# Patient Record
(demographics unavailable — no encounter records)

---

## 2024-12-16 NOTE — PHYSICAL EXAM
[Fully active, able to carry on all pre-disease performance without restriction] : Status 0 - Fully active, able to carry on all pre-disease performance without restriction [Normal] : affect appropriate [de-identified] : Just the erythema on the face consistent with SLE.

## 2024-12-16 NOTE — ASSESSMENT
[FreeTextEntry1] : 1. SLE, coming to explore possibility of continuing her Actemra treatments here since her immunologist is retiring and she is looking for alternative avenues and physician in the meantime.2. 2. Antiphospholipid syndrome, presently on Eliquis. She was on Coumadin but INR has been difficult to control most likely secondary to incomplete warfarin resistance since INR was therapeutic with higher doses.  The situation was discussed with the patient. Will find out if we could help the patient for her treatment of rheumatologic condition.  From coagulation standpoint, she seems to be doing better with Eliquis than Coumadin with no untoward effects. At this time will reevaluate the situation also with obtaining some blood work including CBC, CMP, INR/PTT. Further recommendations, as needed, after the above completed. All questions answered.  F/U in 6 months or sooner if new problems arise.

## 2024-12-16 NOTE — REVIEW OF SYSTEMS
[Night Sweats] : night sweats [Fatigue] : fatigue [Palpitations] : palpitations [Diarrhea: Grade 1 - Increase of <4 stools per day over baseline; mild increase in ostomy output compared to baseline] : Diarrhea: Grade 1 - Increase of <4 stools per day over baseline; mild increase in ostomy output compared to baseline [Joint Pain] : joint pain [Skin Rash] : skin rash [Dizziness] : dizziness [Insomnia] : insomnia [Easy Bruising] : a tendency for easy bruising [Negative] : Genitourinary [FreeTextEntry5] : Transient A-Fib [de-identified] : Facial [de-identified] : Occasionally with the adrenal insufficiency [de-identified] : Every once in a while

## 2024-12-16 NOTE — HISTORY OF PRESENT ILLNESS
[Disease:__________________________] : Disease: [unfilled] [Date: ____________] : Patient's last distress assessment performed on [unfilled]. [0 - No Distress] : Distress Level: 0 [ECOG Performance Status: 0 - Fully active, able to carry on all pre-disease performance without restriction] : Performance Status: 0 - Fully active, able to carry on all pre-disease performance without restriction [de-identified] : The patient is coming for a follow up and continuation of her treatments. The patient is interested in continuing with Actemra here in [place] with us since her physician at [facility] (immunologist) who is retiring, and the patient is looking for alternative avenues. The patient is otherwise stable with no new particular complaints. The patient will continue with her rheumatologist at [facility] since her immunologist will be retiring soon. 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 At present, she feels "OK, some days better than others". She was also diagnosed with adrenal insufficiency, and she was placed on cortisol, midodrine and Fluorinef.  The patient was asking if she could continue to receive her infusion of Actemra with us. In addition, she will be put back on Crestor 40 mg.

## 2024-12-16 NOTE — REVIEW OF SYSTEMS
[Night Sweats] : night sweats [Fatigue] : fatigue [Palpitations] : palpitations [Diarrhea: Grade 1 - Increase of <4 stools per day over baseline; mild increase in ostomy output compared to baseline] : Diarrhea: Grade 1 - Increase of <4 stools per day over baseline; mild increase in ostomy output compared to baseline [Joint Pain] : joint pain [Skin Rash] : skin rash [Dizziness] : dizziness [Insomnia] : insomnia [Easy Bruising] : a tendency for easy bruising [Negative] : Genitourinary [FreeTextEntry5] : Transient A-Fib [de-identified] : Facial [de-identified] : Occasionally with the adrenal insufficiency [de-identified] : Every once in a while

## 2024-12-16 NOTE — PHYSICAL EXAM
[Fully active, able to carry on all pre-disease performance without restriction] : Status 0 - Fully active, able to carry on all pre-disease performance without restriction [Normal] : affect appropriate [de-identified] : Just the erythema on the face consistent with SLE.

## 2024-12-16 NOTE — HISTORY OF PRESENT ILLNESS
[Disease:__________________________] : Disease: [unfilled] [Date: ____________] : Patient's last distress assessment performed on [unfilled]. [0 - No Distress] : Distress Level: 0 [ECOG Performance Status: 0 - Fully active, able to carry on all pre-disease performance without restriction] : Performance Status: 0 - Fully active, able to carry on all pre-disease performance without restriction [de-identified] : The patient is coming for a follow up and continuation of her treatments. The patient is interested in continuing with Actemra here in [place] with us since her physician at [facility] (immunologist) who is retiring, and the patient is looking for alternative avenues. The patient is otherwise stable with no new particular complaints. The patient will continue with her rheumatologist at [facility] since her immunologist will be retiring soon. 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 At present, she feels "OK, some days better than others". She was also diagnosed with adrenal insufficiency, and she was placed on cortisol, midodrine and Fluorinef.  The patient was asking if she could continue to receive her infusion of Actemra with us. In addition, she will be put back on Crestor 40 mg.

## 2025-02-03 NOTE — HISTORY OF PRESENT ILLNESS
[TextBox_4] : Patient coming for follow-up overall she is doing good currently no cough wheezing shortness of breath she is doing good onBreyna  Not require albuterol so frequent if anything once a week Chest x-ray reviewed She is due for PFT Asthma profile showed some sensitivity to cats otherwise normal IgE level was normal result discussed with patient

## 2025-02-03 NOTE — PLAN
[TextEntry] : Continue Breyna Q12 hrs  Albuterol as needed Azelastine at night as needed PFT ordered

## 2025-06-09 NOTE — REVIEW OF SYSTEMS
[Fatigue] : fatigue [Vision Problems] : vision problems [Joint Pain] : joint pain [Joint Stiffness] : joint stiffness [Skin Rash] : skin rash [Dizziness] : dizziness [Insomnia] : insomnia [Easy Bruising] : a tendency for easy bruising [Negative] : Genitourinary [FreeTextEntry3] : Left eye with autoimmune keratitis, uveitis and scleritis. Some vision loss in the right eye  [de-identified] : Mostly face and chest [de-identified] : Especially when the blood pressure goes too low (adrenal insufficiency).

## 2025-06-09 NOTE — HISTORY OF PRESENT ILLNESS
[Disease:__________________________] : Disease: [unfilled] [Date: ____________] : Patient's last distress assessment performed on [unfilled]. [5 - Distress Level] : Distress Level: 5 [ECOG Performance Status: 1 - Restricted in physically strenuous activity but ambulatory and able to carry out work of a light or sedentary nature] : Performance Status: 1 - Restricted in physically strenuous activity but ambulatory and able to carry out work of a light or sedentary nature, e.g., light house work, office work [de-identified] : The patient is coming for a follow up for her SLE and ?associated thrombophilia. She was switched to Evista by her rheumatologist and is off Coumadin. She was put on Eliquis in the past because of high doses of Coumadin needed to have a therapeutic INR (Coumadin resistance). She was recommended to stay on Coumadin for her antiphospholipid syndrome.  The "only problem" she was having was the maintenance of therapeutic INR.   At present, she states that the adrenal insufficiency had taken a toll from her especially in terms of constant fatigue. She had a bone density test also which showed osteopenia.  She is on cortisol 10 mg PO daily and Florinef 0.3 mg and midodrine.

## 2025-06-09 NOTE — REVIEW OF SYSTEMS
[Fatigue] : fatigue [Vision Problems] : vision problems [Joint Pain] : joint pain [Joint Stiffness] : joint stiffness [Skin Rash] : skin rash [Dizziness] : dizziness [Insomnia] : insomnia [Easy Bruising] : a tendency for easy bruising [Negative] : Genitourinary [FreeTextEntry3] : Left eye with autoimmune keratitis, uveitis and scleritis. Some vision loss in the right eye  [de-identified] : Mostly face and chest [de-identified] : Especially when the blood pressure goes too low (adrenal insufficiency).

## 2025-06-09 NOTE — HISTORY OF PRESENT ILLNESS
[Disease:__________________________] : Disease: [unfilled] [Date: ____________] : Patient's last distress assessment performed on [unfilled]. [5 - Distress Level] : Distress Level: 5 [ECOG Performance Status: 1 - Restricted in physically strenuous activity but ambulatory and able to carry out work of a light or sedentary nature] : Performance Status: 1 - Restricted in physically strenuous activity but ambulatory and able to carry out work of a light or sedentary nature, e.g., light house work, office work [de-identified] : The patient is coming for a follow up for her SLE and ?associated thrombophilia. She was switched to Evista by her rheumatologist and is off Coumadin. She was put on Eliquis in the past because of high doses of Coumadin needed to have a therapeutic INR (Coumadin resistance). She was recommended to stay on Coumadin for her antiphospholipid syndrome.  The "only problem" she was having was the maintenance of therapeutic INR.   At present, she states that the adrenal insufficiency had taken a toll from her especially in terms of constant fatigue. She had a bone density test also which showed osteopenia.  She is on cortisol 10 mg PO daily and Florinef 0.3 mg and midodrine.

## 2025-06-09 NOTE — ASSESSMENT
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 From a hematological standpoint, she is stable. The patient was told to stay on Coumadin since that is the usual recommendation for thrombotic situation secondary to antiphospholipid antibodies. However, in her case, the patient seems to have incomplete resistance to Coumadin and was requiring high doses (usually above 12 mg/day) to achieve therapeutic values of the INR. Will not start Coumadin without Lovenox "coverage" first.  Also, the patient has not been tested for VKORC1 but that's not likely since the patient would have had complete warfarin resistance which has not been the case. Would hold off testing for CYPs: 1A2, 2C9, 2C19, 2D6, 3A4 and 3A5, 4F2, [id number]. PGXQP panel could be ordered too but will hold of it for the time being. Will get CBC, CMP, INR/PTT. Further recommendations after those results are available.  All questions answered.

## 2025-06-09 NOTE — ASSESSMENT
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 From a hematological standpoint, she is stable. The patient was told to stay on Coumadin since that is the usual recommendation for thrombotic situation secondary to antiphospholipid antibodies. However, in her case, the patient seems to have incomplete resistance to Coumadin and was requiring high doses (usually above 12 mg/day) to achieve therapeutic values of the INR. Will not start Coumadin without Lovenox "coverage" first.  Also, the patient has not been tested for VKORC1 but that's not likely since the patient would have had complete warfarin resistance which has not been the case. Would hold off testing for CYPs: 1A2, 2C9, 2C19, 2D6, 3A4 and 3A5, 4F2, [id number]. PGXQP panel could be ordered too but will hold of it for the time being. Will get CBC, CMP, INR/PTT. Further recommendations after those results are available.  All questions answered.